# Patient Record
Sex: FEMALE | Employment: FULL TIME | URBAN - METROPOLITAN AREA
[De-identification: names, ages, dates, MRNs, and addresses within clinical notes are randomized per-mention and may not be internally consistent; named-entity substitution may affect disease eponyms.]

---

## 2024-04-14 ENCOUNTER — OFFICE VISIT (OUTPATIENT)
Dept: URGENT CARE | Facility: URGENT CARE | Age: 29
End: 2024-04-14
Payer: COMMERCIAL

## 2024-04-14 VITALS
HEART RATE: 73 BPM | SYSTOLIC BLOOD PRESSURE: 103 MMHG | OXYGEN SATURATION: 100 % | WEIGHT: 162.06 LBS | TEMPERATURE: 99 F | DIASTOLIC BLOOD PRESSURE: 61 MMHG | RESPIRATION RATE: 16 BRPM

## 2024-04-14 DIAGNOSIS — H10.13 ALLERGIC CONJUNCTIVITIS, BILATERAL: Primary | ICD-10-CM

## 2024-04-14 PROCEDURE — 99203 OFFICE O/P NEW LOW 30 MIN: CPT | Performed by: INTERNAL MEDICINE

## 2024-04-14 RX ORDER — AZELASTINE HYDROCHLORIDE 0.5 MG/ML
1 SOLUTION/ DROPS OPHTHALMIC 2 TIMES DAILY
Qty: 6 ML | Refills: 0 | Status: SHIPPED | OUTPATIENT
Start: 2024-04-14

## 2024-04-14 ASSESSMENT — ENCOUNTER SYMPTOMS
SORE THROAT: 0
EYE ITCHING: 1
RHINORRHEA: 0

## 2024-04-15 NOTE — PROGRESS NOTES
ASSESSMENT AND PLAN:      ICD-10-CM    1. Allergic conjunctivitis, bilateral  H10.13 azelastine (OPTIVAR) 0.05 % ophthalmic solution        PLAN:      Patient Instructions   Wash cold water 2 x day   Followed by allergy eye drops.    Also after outside for long time.      aMrta Zavala MD  [facility]    Subjective     Adamaris Wells is a 28 year old who presents for Patient presents with:  Eye Problem: 2 weeks, bilateral eyelid swollen, itchy, crusty, redness    a new patient of [facility].    Eye Problem    Onset of symptoms was 2 week(s) ago.   Location: both eyes   Course of illness is same.    Current and Associated symptoms: white drydischarge, pain, redness, eyelid swelling - very itchy  symptoms worse in evenings  Treatment measures tried include abx ointment OTC, eye drops for red eyes  Context: Pink eye exposure and Recent URI      Review of Systems   HENT:  Negative for rhinorrhea and sore throat.    Eyes:  Positive for itching.           Objective    /61   Pulse 73   Temp 99  F (37.2  C)   Resp 16   Wt 73.5 kg (162 lb 1 oz)   SpO2 100%   Physical Exam  Vitals reviewed.   Constitutional:       Appearance: Normal appearance.   Eyes:      General:         Right eye: No discharge.         Left eye: No discharge.      Conjunctiva/sclera: Conjunctivae normal.   Neurological:      Mental Status: She is alert.       Showed photo bilateral sclera injected.